# Patient Record
Sex: FEMALE | Race: WHITE | NOT HISPANIC OR LATINO | URBAN - METROPOLITAN AREA
[De-identification: names, ages, dates, MRNs, and addresses within clinical notes are randomized per-mention and may not be internally consistent; named-entity substitution may affect disease eponyms.]

---

## 2022-03-15 ENCOUNTER — APPOINTMENT (RX ONLY)
Dept: URBAN - METROPOLITAN AREA CLINIC 12 | Facility: CLINIC | Age: 22
Setting detail: DERMATOLOGY
End: 2022-03-15

## 2022-03-15 DIAGNOSIS — Z41.1 ENCOUNTER FOR COSMETIC SURGERY: ICD-10-CM

## 2022-03-15 PROCEDURE — ? CONSULTATION - RHINOPLASTY

## 2022-03-15 PROCEDURE — ? PATIENT SPECIFIC COUNSELING

## 2022-03-15 NOTE — PROCEDURE: PATIENT SPECIFIC COUNSELING
Other (Free Text): Patient presents to clinic today for rhinoplasty consult.  Patient is unsatisfied with the size of her nose and tip drooping down. Patient has no prior history of nasal trauma. Patient has a history of nasal rhinitis and seasonal allergies. Patient is currently on Flonase, Zyrtec, and inhaler. \\n\\Vanessa Mcclellan examined the patient. Noted that the tip does not go down with her muscle. Patient nasal tip shifts to the right just slightly. Anterior deviation to the left. Speculum and turbinates look normal. Dr. Mcclellan advised patient that she is a good candidate for septoplasty and rhinoplasty. Dr. Mcclellan advised patient that her rhinitis may be exasperated following the procedure due to post surgery inflammation. Kaykay stepped in to provide a quote.
Detail Level: Simple

## 2022-03-15 NOTE — HPI: NOSE (AESTHETIC DEFORMITY, NOSE)
Which Side(S)?: left side
How Severe Is It?: moderate
Is This A New Presentation, Or A Follow-Up?: Nasal Aesthetic Deformity

## 2022-06-20 ENCOUNTER — APPOINTMENT (RX ONLY)
Dept: URBAN - METROPOLITAN AREA CLINIC 12 | Facility: CLINIC | Age: 22
Setting detail: DERMATOLOGY
End: 2022-06-20

## 2022-06-20 DIAGNOSIS — Z41.1 ENCOUNTER FOR COSMETIC SURGERY: ICD-10-CM

## 2022-06-20 PROCEDURE — ? OTHER (COSMETIC)

## 2022-06-20 PROCEDURE — ? PRE-OP WORKLIST

## 2022-06-20 NOTE — HPI: PREOPERATIVE APPOINTMENT
Has The Patient Completed Informed Consent?: is scheduled to complete informed consent documentation during this visit
Has The Patient Fulfilled Financial Responsibilities For Surgical Scheduling?: is scheduled to complete payment to fulfill financial responsibilities during this visit
Have Arrangements And Instructions Been Made For Patient Transportation?: transportation arrangements have been made
Date Of Procedure?: 06/30/2022
Facility: [facility] ([facility]
Surgeon: Dr. Catracho Mcclellan

## 2022-06-20 NOTE — PROCEDURE: OTHER (COSMETIC)
Detail Level: Zone
Sticky Other (Free Text): Per Dr. Mcclellan No Charge
Price $ (Use Numbers Only, No Text Please.): 0
Other (Free Text): Pre-Op Appointment

## 2022-07-05 ENCOUNTER — APPOINTMENT (RX ONLY)
Dept: URBAN - METROPOLITAN AREA CLINIC 12 | Facility: CLINIC | Age: 22
Setting detail: DERMATOLOGY
End: 2022-07-05

## 2022-07-05 DIAGNOSIS — Z48.89 ENCOUNTER FOR OTHER SPECIFIED SURGICAL AFTERCARE: ICD-10-CM

## 2022-07-05 PROCEDURE — ? SUTURE REMOVAL

## 2022-07-05 PROCEDURE — ? PATIENT SPECIFIC COUNSELING

## 2022-07-05 PROCEDURE — ? COUNSELING - POST-OP CHECK

## 2022-07-05 PROCEDURE — ? POST-OP CHECK

## 2022-07-05 PROCEDURE — 99024 POSTOP FOLLOW-UP VISIT: CPT

## 2022-07-05 ASSESSMENT — LOCATION SIMPLE DESCRIPTION DERM: LOCATION SIMPLE: NOSE

## 2022-07-05 ASSESSMENT — LOCATION DETAILED DESCRIPTION DERM: LOCATION DETAILED: NASAL DORSUM

## 2022-07-05 ASSESSMENT — LOCATION ZONE DERM: LOCATION ZONE: NOSE

## 2022-07-08 ENCOUNTER — APPOINTMENT (RX ONLY)
Dept: URBAN - METROPOLITAN AREA CLINIC 12 | Facility: CLINIC | Age: 22
Setting detail: DERMATOLOGY
End: 2022-07-08

## 2022-07-08 DIAGNOSIS — Z48.89 ENCOUNTER FOR OTHER SPECIFIED SURGICAL AFTERCARE: ICD-10-CM

## 2022-07-08 PROCEDURE — ? POST-OP CHECK

## 2022-07-08 PROCEDURE — ? PATIENT SPECIFIC COUNSELING

## 2022-07-08 PROCEDURE — 99024 POSTOP FOLLOW-UP VISIT: CPT

## 2022-07-08 PROCEDURE — ? COUNSELING - POST-OP CHECK

## 2022-07-08 ASSESSMENT — LOCATION DETAILED DESCRIPTION DERM: LOCATION DETAILED: NASAL DORSUM

## 2022-07-08 ASSESSMENT — LOCATION ZONE DERM: LOCATION ZONE: NOSE

## 2022-07-08 ASSESSMENT — LOCATION SIMPLE DESCRIPTION DERM: LOCATION SIMPLE: NOSE

## 2022-07-08 NOTE — PROCEDURE: PATIENT SPECIFIC COUNSELING
Other (Free Text): Patient present today for post op check. Patient reports no problems with healing and is doing well. Dr. Mcclellan examined patient and voiced everything looks good and is healing well. Patient advised to continue taping nose for one more week. Patient advised to apply arnica gel to help with bruising underneath eyes. Patient advised to continue taking things easy. Patient verbalized agreement and understanding. Patient advised to follow up in two weeks.
Detail Level: Simple

## 2022-07-25 ENCOUNTER — APPOINTMENT (RX ONLY)
Dept: URBAN - METROPOLITAN AREA CLINIC 12 | Facility: CLINIC | Age: 22
Setting detail: DERMATOLOGY
End: 2022-07-25

## 2022-07-25 DIAGNOSIS — Z48.89 ENCOUNTER FOR OTHER SPECIFIED SURGICAL AFTERCARE: ICD-10-CM

## 2022-07-25 PROCEDURE — ? COUNSELING - POST-OP CHECK

## 2022-07-25 PROCEDURE — 99024 POSTOP FOLLOW-UP VISIT: CPT

## 2022-07-25 PROCEDURE — ? POST-OP CHECK

## 2022-07-25 PROCEDURE — ? PATIENT SPECIFIC COUNSELING

## 2022-07-25 ASSESSMENT — LOCATION ZONE DERM: LOCATION ZONE: NOSE

## 2022-07-25 ASSESSMENT — LOCATION SIMPLE DESCRIPTION DERM: LOCATION SIMPLE: NOSE

## 2022-07-25 ASSESSMENT — LOCATION DETAILED DESCRIPTION DERM: LOCATION DETAILED: NASAL DORSUM

## 2022-07-25 NOTE — PROCEDURE: PATIENT SPECIFIC COUNSELING
Other (Free Text): Patient presents today for s/p septorhinoplasty on 6/30/22. Patient reports no problems with healing or bleeding and is doing well. Patient states that she experiences some stuffiness in the morning but feels that she’s breathing well overall. Patient states that she notices significantly less swelling and is pleased with her results. \\Vanessa Mcclellan examined patient and voiced everything looks good and is healing well. Dr. Mcclellan noted some slight inflammation and residual bruising inside the nose but stated that she’s healing much better than he initially expected. Patient advised to continue taking things easy. Patient verbalized agreement and understanding. Patient advised to follow up in two months.
Detail Level: Simple

## 2022-09-27 ENCOUNTER — APPOINTMENT (RX ONLY)
Dept: URBAN - METROPOLITAN AREA CLINIC 12 | Facility: CLINIC | Age: 22
Setting detail: DERMATOLOGY
End: 2022-09-27

## 2022-09-27 DIAGNOSIS — Z48.89 ENCOUNTER FOR OTHER SPECIFIED SURGICAL AFTERCARE: ICD-10-CM

## 2022-09-27 PROCEDURE — ? COUNSELING - POST-OP CHECK

## 2022-09-27 PROCEDURE — ? POST-OP CHECK

## 2022-09-27 PROCEDURE — 99024 POSTOP FOLLOW-UP VISIT: CPT

## 2022-09-27 PROCEDURE — ? PATIENT SPECIFIC COUNSELING

## 2022-09-27 ASSESSMENT — LOCATION SIMPLE DESCRIPTION DERM: LOCATION SIMPLE: NOSE

## 2022-09-27 ASSESSMENT — LOCATION ZONE DERM: LOCATION ZONE: NOSE

## 2022-09-27 ASSESSMENT — LOCATION DETAILED DESCRIPTION DERM: LOCATION DETAILED: NASAL DORSUM

## 2022-09-27 NOTE — PROCEDURE: PATIENT SPECIFIC COUNSELING
Other (Free Text): Patient presents today for s/p septorhinoplasty on 6/30/22. Patient reports no problems with healing or bleeding and is doing well. Patient states that she experiences some congestion and drainage due to seasonal allergies but feels that she’s breathing well overall. Patient states very pleased with her results. \\Vanessa Mcclellan examined patient and voiced everything looks good and is healing well. Dr. Mcclellan noted slight swelling in the nasal bridge and tip of nose and stated the firmness she feels is due to the swelling. Dr. Mcclellan voiced that patient’s septum and membranes look great and healthy. Dr. Mcclellan noted some congestion in the nose most likely due to her seasonal allergies. Dr. Mcclellan voiced that he is pleased with her results and she continues to heal much better than he initially expected. Patient advised to continue taking things easy. Patient verbalized agreement and understanding. Patient advised to follow up in 4 months.
Detail Level: Simple

## 2023-01-20 ENCOUNTER — APPOINTMENT (RX ONLY)
Dept: URBAN - METROPOLITAN AREA CLINIC 12 | Facility: CLINIC | Age: 23
Setting detail: DERMATOLOGY
End: 2023-01-20

## 2023-01-20 DIAGNOSIS — Z48.89 ENCOUNTER FOR OTHER SPECIFIED SURGICAL AFTERCARE: ICD-10-CM

## 2023-01-20 PROCEDURE — 99024 POSTOP FOLLOW-UP VISIT: CPT

## 2023-01-20 PROCEDURE — ? PATIENT SPECIFIC COUNSELING

## 2023-01-20 PROCEDURE — ? POST-OP CHECK

## 2023-01-20 PROCEDURE — ? COUNSELING - POST-OP CHECK

## 2023-01-20 ASSESSMENT — LOCATION DETAILED DESCRIPTION DERM: LOCATION DETAILED: NASAL DORSUM

## 2023-01-20 ASSESSMENT — LOCATION SIMPLE DESCRIPTION DERM: LOCATION SIMPLE: NOSE

## 2023-01-20 ASSESSMENT — LOCATION ZONE DERM: LOCATION ZONE: NOSE

## 2023-01-20 NOTE — PROCEDURE: PATIENT SPECIFIC COUNSELING
Other (Free Text): Patient presents today for s/p septorhinoplasty on 6/30/22. Patient reports no problems and is happy with results. Patient denies any concerns or complaints.   \\n\\Vanessa Mcclellan examined patient and voiced everything looks good and is healing well at 90% would like to see the patient back in 1 year.
Detail Level: Simple

## 2023-07-17 ENCOUNTER — APPOINTMENT (RX ONLY)
Dept: URBAN - METROPOLITAN AREA CLINIC 12 | Facility: CLINIC | Age: 23
Setting detail: DERMATOLOGY
End: 2023-07-17

## 2023-07-17 DIAGNOSIS — Z48.89 ENCOUNTER FOR OTHER SPECIFIED SURGICAL AFTERCARE: ICD-10-CM

## 2023-07-17 PROCEDURE — ? COUNSELING - POST-OP CHECK

## 2023-07-17 PROCEDURE — 99024 POSTOP FOLLOW-UP VISIT: CPT

## 2023-07-17 PROCEDURE — ? POST-OP CHECK

## 2023-07-17 PROCEDURE — ? PATIENT SPECIFIC COUNSELING

## 2023-07-17 ASSESSMENT — LOCATION SIMPLE DESCRIPTION DERM: LOCATION SIMPLE: NOSE

## 2023-07-17 ASSESSMENT — LOCATION DETAILED DESCRIPTION DERM: LOCATION DETAILED: NASAL DORSUM

## 2023-07-17 ASSESSMENT — LOCATION ZONE DERM: LOCATION ZONE: NOSE

## 2023-07-17 NOTE — PROCEDURE: PATIENT SPECIFIC COUNSELING
Other (Free Text): Patient presents today for s/p septoplasty/ rhinoplasty on 6/30/22. Patient reports no problems and is happy with results. Patient denies any concerns or complaints patient is here for her 1 year follow up. \\n\\Minda. Vy evaluated  patient and voiced. Nose looks great, internal exam is normal no issues with septum and turbinates. Patient was graduated today.
Detail Level: Simple